# Patient Record
(demographics unavailable — no encounter records)

---

## 2025-05-22 NOTE — PHYSICAL EXAM
[de-identified] : Constitutional: 63 year old female, alert and oriented, cooperative, in no acute distress.  HEENT  NC/AT.  Appearance: symmetric  Neck/Back Straight without deformity or instability.  Good ROM.  Chest/Respiratory  Respiratory effort: no intercostal retractions or use of accessory muscles. Nonlabored Breathing  Mental Status:  Judgment, insight: intact Orientation: oriented to time, place, and person  Neurological: Sensory and Motor are grossly intact throughout  Right Hip Exam: Inspection/Appearance:      Incision well healing. There is a small area of serous drainage at the distal aspect of the incision. No erythema. No purulence.  Neurologic Exam     Motor intact including 5/5 Extensor Hallucis Longus, 5/5 Flexor Hallucis Longus, 5/5 Tibialis Anterior and 5/5 Gastrocnemius     Sensation Intact to Light Touch including Saphenous, Sural, Superficial Peroneal, Deep Peroneal, Tibial nerve distributions

## 2025-05-22 NOTE — DISCUSSION/SUMMARY
[de-identified] : Jody Broussard is a 63-year-old female who presents to the office for follow-up of her right hip DAIR.  Discussed with patient the management of her right hip at this time.  Patient will continue her IV antibiotics.  Discussed follow-up with infectious disease.  Discussed chronic suppressive antibiotics.  Discussed management of patient's incision at this time.  Prevena wound VAC was placed over the distal aspect of the incision.  Discussed that there is no purulence at this time.  Discussed that there is mild serous drainage.  Patient will continue with physical therapy.  Patient will follow-up in 1 week for reevaluation and management.  Patient understanding and in agreement with the plan.  All questions answered.  Plan: - Prevena wound VAC - Follow-up with infectious disease - Likely will need long-term chronic oral suppressive antibiotics - Follow-up in 1 week for reevaluation and management

## 2025-05-22 NOTE — HISTORY OF PRESENT ILLNESS
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  Patient is currently doing well overall.  Her hip pain has significantly improved.  She is currently on IV antibiotics.  Patient does have some mild serous drainage from the distal aspect of her wound.  This is improving.  No fevers or chills.

## 2025-05-22 NOTE — REVIEW OF SYSTEMS
[Joint Pain] : joint pain [Joint Stiffness] : no joint stiffness [Joint Swelling] : no joint swelling [Fever] : no fever [Chills] : no chills

## 2025-05-29 NOTE — DISCUSSION/SUMMARY
[de-identified] : Jody Broussard is a 63-year-old female who presents to the office for follow-up of her right hip DAIR.  Discussed with patient the management of her right hip at this time.  Patient will continue her IV antibiotics.  Discussed follow-up with infectious disease.  Discussed chronic suppressive antibiotics.  Discussed management of patient's incision at this time.  Prevena wound VAC was placed over the distal aspect of the incision.  Discussed that there is no purulence at this time.  Discussed that there is mild serous drainage.  Patient will continue with physical therapy.  Patient will follow-up in 1 week for reevaluation and management.  Patient understanding and in agreement with the plan.  All questions answered.  Plan: - Prevena wound VAC - Follow-up with infectious disease - Likely will need long-term chronic oral suppressive antibiotics - Follow-up in 1 week for reevaluation and management

## 2025-05-29 NOTE — REVIEW OF SYSTEMS
[Joint Pain] : joint pain [Joint Swelling] : no joint swelling [Joint Stiffness] : no joint stiffness [Fever] : no fever [Chills] : no chills

## 2025-05-29 NOTE — PHYSICAL EXAM
[de-identified] : Constitutional: 63 year old female, alert and oriented, cooperative, in no acute distress.  HEENT  NC/AT.  Appearance: symmetric  Neck/Back Straight without deformity or instability.  Good ROM.  Chest/Respiratory  Respiratory effort: no intercostal retractions or use of accessory muscles. Nonlabored Breathing  Mental Status:  Judgment, insight: intact Orientation: oriented to time, place, and person  Neurological: Sensory and Motor are grossly intact throughout  Right Hip Exam: Inspection/Appearance:      Incision well healing. There is a small area of serous drainage at the distal aspect of the incision. No erythema. No purulence. Drainage improving.  Neurologic Exam     Motor intact including 5/5 Extensor Hallucis Longus, 5/5 Flexor Hallucis Longus, 5/5 Tibialis Anterior and 5/5 Gastrocnemius     Sensation Intact to Light Touch including Saphenous, Sural, Superficial Peroneal, Deep Peroneal, Tibial nerve distributions

## 2025-05-29 NOTE — HISTORY OF PRESENT ILLNESS
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  Patient is currently doing well overall.  Her hip pain has significantly improved.  She is currently on IV antibiotics.  Patient does have some mild serous drainage from the distal aspect of her wound.  This is improving with wound vac.  No fevers or chills.  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  Patient is currently doing well overall.  Her hip pain has significantly improved.  She is currently on IV antibiotics.  Patient does have some mild serous drainage from the distal aspect of her wound.  This is improving.  No fevers or chills.

## 2025-06-01 NOTE — HISTORY OF PRESENT ILLNESS
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  Patient is currently doing well overall.  Her hip pain has significantly improved.  She is currently on IV antibiotics.  Patient does have some mild serous drainage from the distal aspect of her wound.  This is improving with wound vac.  No fevers or chills.  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  Patient is currently doing well overall.  Her hip pain has significantly improved.  She is currently on IV antibiotics.  Patient does have some mild serous drainage from the distal aspect of her wound.  This is improving.  No fevers or chills.

## 2025-06-01 NOTE — PHYSICAL EXAM
[de-identified] : Constitutional: 63 year old female, alert and oriented, cooperative, in no acute distress.  HEENT  NC/AT.  Appearance: symmetric  Neck/Back Straight without deformity or instability.  Good ROM.  Chest/Respiratory  Respiratory effort: no intercostal retractions or use of accessory muscles. Nonlabored Breathing  Mental Status:  Judgment, insight: intact Orientation: oriented to time, place, and person  Neurological: Sensory and Motor are grossly intact throughout  Right Hip Exam: Inspection/Appearance:      Incision well healing. There is a small area of serous drainage at the distal aspect of the incision. No erythema. No purulence. Drainage improving.  Neurologic Exam     Motor intact including 5/5 Extensor Hallucis Longus, 5/5 Flexor Hallucis Longus, 5/5 Tibialis Anterior and 5/5 Gastrocnemius     Sensation Intact to Light Touch including Saphenous, Sural, Superficial Peroneal, Deep Peroneal, Tibial nerve distributions

## 2025-06-01 NOTE — DISCUSSION/SUMMARY
[de-identified] : Jody Broussard is a 63-year-old female who presents to the office for follow-up of her right hip DAIR.  Discussed with patient the management of her right hip at this time.  Patient will continue her IV antibiotics.  Discussed follow-up with infectious disease.  Discussed chronic suppressive antibiotics.  Discussed management of patient's incision at this time.  Prevena wound VAC was placed over the distal aspect of the incision.  Discussed that there is no purulence at this time.  Discussed that there is mild serous drainage.  Patient will continue with physical therapy.  Patient will follow-up in 1 week for reevaluation and management.  Patient understanding and in agreement with the plan.  All questions answered.  Plan: - Prevena wound VAC - Follow-up with infectious disease - Likely will need long-term chronic oral suppressive antibiotics - Follow-up in 1 week for reevaluation and management

## 2025-06-03 NOTE — DISCUSSION/SUMMARY
[de-identified] : Jody Broussard is a 63-year-old female who presents to the office for follow-up of her right hip DAIR.  Discussed with patient the management of her right hip at this time.  Patient will continue her IV antibiotics.  Discussed follow-up with infectious disease.  Discussed chronic suppressive antibiotics.  Discussed management of patient's incision at this time.  Prevena wound VAC was placed over the distal aspect of the incision.  Discussed that there is no purulence at this time.  Discussed that there is mild serous drainage.  Patient will continue with physical therapy.  Patient will follow-up in 1 week for reevaluation and management.  Patient understanding and in agreement with the plan.  All questions answered.  Plan: - Prevena wound VAC - Follow-up with infectious disease - Likely will need long-term chronic oral suppressive antibiotics - Follow-up in 1 week for reevaluation and management

## 2025-06-03 NOTE — HISTORY OF PRESENT ILLNESS
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  Patient is currently doing well overall.  Her hip pain has significantly improved.  She is currently on IV antibiotics.  Patient does have some mild serous drainage from the distal aspect of her wound.  This is improving with wound vac.  No fevers or chills.  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  Patient is currently doing well overall.  Her hip pain has significantly improved.  She is currently on IV antibiotics.  Patient does have some mild serous drainage from the distal aspect of her wound.  This is improving.  No fevers or chills.

## 2025-06-03 NOTE — HISTORY OF PRESENT ILLNESS
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  Patient is currently doing well overall.  Her hip pain has significantly improved.  She is currently on IV antibiotics.  Patient does have some mild serous drainage from the distal aspect of her wound.  This is improving with wound vac.  No fevers or chills.  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  Patient is currently doing well overall.  Her hip pain has significantly improved.  She is currently on IV antibiotics.  Patient does have some mild serous drainage from the distal aspect of her wound.  This is improving.  No fevers or chills.

## 2025-06-03 NOTE — PHYSICAL EXAM
[de-identified] : Constitutional: 63 year old female, alert and oriented, cooperative, in no acute distress.  HEENT  NC/AT.  Appearance: symmetric  Neck/Back Straight without deformity or instability.  Good ROM.  Chest/Respiratory  Respiratory effort: no intercostal retractions or use of accessory muscles. Nonlabored Breathing  Mental Status:  Judgment, insight: intact Orientation: oriented to time, place, and person  Neurological: Sensory and Motor are grossly intact throughout  Right Hip Exam: Inspection/Appearance:      Incision well healing. There is a small area of serous drainage at the distal aspect of the incision. No erythema. No purulence. Drainage improving.  Neurologic Exam     Motor intact including 5/5 Extensor Hallucis Longus, 5/5 Flexor Hallucis Longus, 5/5 Tibialis Anterior and 5/5 Gastrocnemius     Sensation Intact to Light Touch including Saphenous, Sural, Superficial Peroneal, Deep Peroneal, Tibial nerve distributions

## 2025-06-03 NOTE — DISCUSSION/SUMMARY
[de-identified] : Jody Broussard is a 63-year-old female who presents to the office for follow-up of her right hip DAIR.  Discussed with patient the management of her right hip at this time.  Patient will continue her IV antibiotics.  Discussed follow-up with infectious disease.  Discussed chronic suppressive antibiotics.  Discussed management of patient's incision at this time.  Prevena wound VAC was placed over the distal aspect of the incision.  Discussed that there is no purulence at this time.  Discussed that there is mild serous drainage.  Patient will continue with physical therapy.  Patient will follow-up in 1 week for reevaluation and management.  Patient understanding and in agreement with the plan.  All questions answered.  Plan: - Prevena wound VAC - Follow-up with infectious disease - Likely will need long-term chronic oral suppressive antibiotics - Follow-up in 1 week for reevaluation and management

## 2025-06-03 NOTE — PHYSICAL EXAM
[de-identified] : Constitutional: 63 year old female, alert and oriented, cooperative, in no acute distress.  HEENT  NC/AT.  Appearance: symmetric  Neck/Back Straight without deformity or instability.  Good ROM.  Chest/Respiratory  Respiratory effort: no intercostal retractions or use of accessory muscles. Nonlabored Breathing  Mental Status:  Judgment, insight: intact Orientation: oriented to time, place, and person  Neurological: Sensory and Motor are grossly intact throughout  Right Hip Exam: Inspection/Appearance:      Incision well healing. There is a small area of serous drainage at the distal aspect of the incision. No erythema. No purulence. Drainage improving.  Neurologic Exam     Motor intact including 5/5 Extensor Hallucis Longus, 5/5 Flexor Hallucis Longus, 5/5 Tibialis Anterior and 5/5 Gastrocnemius     Sensation Intact to Light Touch including Saphenous, Sural, Superficial Peroneal, Deep Peroneal, Tibial nerve distributions

## 2025-06-06 NOTE — ASSESSMENT
[FreeTextEntry1] : 64 y/o F w/ PMHx of new dx DM2 (A1c 13.7),  b/l septic arthritis s/p IV abx, avascular necrosis, b/l RAJ in 2014 admitted for non-traumatic R hip pain, seen by ortho w/ low c/f PJI, however now with fevers and leukocytosis, only mild on presentation. S/p CT A/P w/ c/f cholangitis, MRCP w/ possible debris vs stone now s/p ERCP w/ only mild sludge, no stones.   MRI R Hip with very large joint effusion 16 x 7.8 x 3.5, s/p IR hip arthrocentesis on 4/24, 163k cells, PCR w/ strep pneumonia, consistent PJI. S/p DAIR on 4/27, RTOR on 5/3 for repeat revision. Pt discharged on Ceftriaxone 2 g q24 for 6 weeks after surgery until 6/7/25.   Pt doing well post OP recovering appropriately.   Recommendations:  Ceftriaxone 2 g q24 for 6 weeks after surgery until 6/7/25, can remove PICC after.  Then Cefuroxime 500 mg BID PO until 7/18/25, then suppression with Cefuroxime 250 mg PO  Follow up with me in 1 month

## 2025-06-06 NOTE — HISTORY OF PRESENT ILLNESS
[FreeTextEntry1] : 64 y/o F w/ PMHx of new dx DM2 (A1c 13.7),  b/l septic arthritis s/p IV abx, avascular necrosis, b/l RAJ in 2014 admitted for non-traumatic R hip pain, seen by ortho w/ low c/f PJI, however now with fevers and leukocytosis, only mild on presentation. S/p CT A/P w/ c/f cholangitis, MRCP w/ possible debris vs stone now s/p ERCP w/ only mild sludge, no stones.   MRI R Hip with very large joint effusion 16 x 7.8 x 3.5, s/p IR hip arthrocentesis on 4/24, 163k cells, PCR w/ strep pneumonia, consistent PJI. S/p DAIR on 4/27, RTOR on 5/3 for repeat revision. Pt discharged on Ceftriaxone 2 g q24 for 6 weeks after surgery.  Pt seen in the office, doing well since her surgery, she is currently at [place], receiving Ceftriaxone via PICC. States she is having daily PT, R hip is feeling well, wound VAC removed.

## 2025-06-06 NOTE — HISTORY OF PRESENT ILLNESS
[FreeTextEntry1] : 62 y/o F w/ PMHx of new dx DM2 (A1c 13.7),  b/l septic arthritis s/p IV abx, avascular necrosis, b/l RAJ in 2014 admitted for non-traumatic R hip pain, seen by ortho w/ low c/f PJI, however now with fevers and leukocytosis, only mild on presentation. S/p CT A/P w/ c/f cholangitis, MRCP w/ possible debris vs stone now s/p ERCP w/ only mild sludge, no stones.   MRI R Hip with very large joint effusion 16 x 7.8 x 3.5, s/p IR hip arthrocentesis on 4/24, 163k cells, PCR w/ strep pneumonia, consistent PJI. S/p DAIR on 4/27, RTOR on 5/3 for repeat revision. Pt discharged on Ceftriaxone 2 g q24 for 6 weeks after surgery.  Pt seen in the office, doing well since her surgery, she is currently at [place], receiving Ceftriaxone via PICC. States she is having daily PT, R hip is feeling well, wound VAC removed.

## 2025-06-06 NOTE — PHYSICAL EXAM
[General Appearance - Alert] : alert [General Appearance - In No Acute Distress] : in no acute distress [General Appearance - Well Nourished] : well nourished [Sclera] : the sclera and conjunctiva were normal [Outer Ear] : the ears and nose were normal in appearance [Neck Appearance] : the appearance of the neck was normal [Respiration, Rhythm And Depth] : normal respiratory rhythm and effort [Exaggerated Use Of Accessory Muscles For Inspiration] : no accessory muscle use [Auscultation Breath Sounds / Voice Sounds] : lungs were clear to auscultation bilaterally [Heart Rate And Rhythm] : heart rate was normal and rhythm regular [Heart Sounds] : normal S1 and S2 [Bowel Sounds] : normal bowel sounds [Abdomen Tenderness] : non-tender [] : no rash [FreeTextEntry1] : LUE PICC well appearing  [Oriented To Time, Place, And Person] : oriented to person, place, and time [Affect] : the affect was normal

## 2025-06-06 NOTE — ASSESSMENT
[FreeTextEntry1] : 62 y/o F w/ PMHx of new dx DM2 (A1c 13.7),  b/l septic arthritis s/p IV abx, avascular necrosis, b/l RAJ in 2014 admitted for non-traumatic R hip pain, seen by ortho w/ low c/f PJI, however now with fevers and leukocytosis, only mild on presentation. S/p CT A/P w/ c/f cholangitis, MRCP w/ possible debris vs stone now s/p ERCP w/ only mild sludge, no stones.   MRI R Hip with very large joint effusion 16 x 7.8 x 3.5, s/p IR hip arthrocentesis on 4/24, 163k cells, PCR w/ strep pneumonia, consistent PJI. S/p DAIR on 4/27, RTOR on 5/3 for repeat revision. Pt discharged on Ceftriaxone 2 g q24 for 6 weeks after surgery until 6/7/25.   Pt doing well post OP recovering appropriately.   Recommendations:  Ceftriaxone 2 g q24 for 6 weeks after surgery until 6/7/25, can remove PICC after.  Then Cefuroxime 500 mg BID PO until 7/18/25, then suppression with Cefuroxime 250 mg PO  Follow up with me in 1 month

## 2025-06-12 NOTE — DISCUSSION/SUMMARY
[de-identified] : Jody Broussard is a 63-year-old female who presents to the office for follow-up of her right hip DAIR.  Discussed with patient the management of her right hip at this time.  Patient will continue her IV antibiotics.  Discussed follow-up with infectious disease.  Discussed chronic suppressive antibiotics.  Discussed management of patient's incision at this time.  Telfa dressing was placed. Discussed that there is no purulence at this time. Patient will continue with physical therapy.  Patient will follow-up in 2 weeks for reevaluation and management.  Patient understanding and in agreement with the plan.  All questions answered.  Plan: - Dressing changes - Follow-up with infectious disease - Likely will need long-term chronic oral suppressive antibiotics - Follow-up in 2 week for reevaluation and management

## 2025-06-12 NOTE — PHYSICAL EXAM
[de-identified] : Constitutional: 63 year old female, alert and oriented, cooperative, in no acute distress.  HEENT  NC/AT.  Appearance: symmetric  Neck/Back Straight without deformity or instability.  Good ROM.  Chest/Respiratory  Respiratory effort: no intercostal retractions or use of accessory muscles. Nonlabored Breathing  Mental Status:  Judgment, insight: intact Orientation: oriented to time, place, and person  Neurological: Sensory and Motor are grossly intact throughout  Right Hip Exam: Inspection/Appearance:      Incision well healing. No erythema. No purulence. Drainage improved.  Neurologic Exam     Motor intact including 5/5 Extensor Hallucis Longus, 5/5 Flexor Hallucis Longus, 5/5 Tibialis Anterior and 5/5 Gastrocnemius     Sensation Intact to Light Touch including Saphenous, Sural, Superficial Peroneal, Deep Peroneal, Tibial nerve distributions

## 2025-06-12 NOTE — PHYSICAL EXAM
[de-identified] : Constitutional: 63 year old female, alert and oriented, cooperative, in no acute distress.  HEENT  NC/AT.  Appearance: symmetric  Neck/Back Straight without deformity or instability.  Good ROM.  Chest/Respiratory  Respiratory effort: no intercostal retractions or use of accessory muscles. Nonlabored Breathing  Mental Status:  Judgment, insight: intact Orientation: oriented to time, place, and person  Neurological: Sensory and Motor are grossly intact throughout  Right Hip Exam: Inspection/Appearance:      Incision well healing. No erythema. No purulence. Drainage improved.  Neurologic Exam     Motor intact including 5/5 Extensor Hallucis Longus, 5/5 Flexor Hallucis Longus, 5/5 Tibialis Anterior and 5/5 Gastrocnemius     Sensation Intact to Light Touch including Saphenous, Sural, Superficial Peroneal, Deep Peroneal, Tibial nerve distributions

## 2025-06-12 NOTE — HISTORY OF PRESENT ILLNESS
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 Patient is currently doing well overall. Her hip pain has significantly improved. She is currently on PO antibiotics.   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  Patient is currently doing well overall.  Her hip pain has significantly improved.  She is currently on IV antibiotics.  Patient does have some mild serous drainage from the distal aspect of her wound.  This is improving with wound vac.  No fevers or chills.  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  Patient is currently doing well overall.  Her hip pain has significantly improved.  She is currently on IV antibiotics.  Patient does have some mild serous drainage from the distal aspect of her wound.  This is improving.  No fevers or chills.

## 2025-06-12 NOTE — DISCUSSION/SUMMARY
Recommend OBSERVATION and continued MONITORING for progression. [de-identified] : Jody Broussard is a 63-year-old female who presents to the office for follow-up of her right hip DAIR.  Discussed with patient the management of her right hip at this time.  Patient will continue her IV antibiotics.  Discussed follow-up with infectious disease.  Discussed chronic suppressive antibiotics.  Discussed management of patient's incision at this time.  Telfa dressing was placed. Discussed that there is no purulence at this time. Patient will continue with physical therapy.  Patient will follow-up in 2 weeks for reevaluation and management.  Patient understanding and in agreement with the plan.  All questions answered.  Plan: - Dressing changes - Follow-up with infectious disease - Likely will need long-term chronic oral suppressive antibiotics - Follow-up in 2 week for reevaluation and management

## 2025-06-12 NOTE — HISTORY OF PRESENT ILLNESS
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 Patient is currently doing well overall. Her hip pain has significantly improved. She is currently on PO antibiotics.   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  Patient is currently doing well overall.  Her hip pain has significantly improved.  She is currently on IV antibiotics.  Patient does have some mild serous drainage from the distal aspect of her wound.  This is improving with wound vac.  No fevers or chills.  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  Patient is currently doing well overall.  Her hip pain has significantly improved.  She is currently on IV antibiotics.  Patient does have some mild serous drainage from the distal aspect of her wound.  This is improving.  No fevers or chills. no

## 2025-06-26 NOTE — DISCUSSION/SUMMARY
[de-identified] : Jody Broussard is a 63-year-old female who presents to the office for follow-up of her right hip DAIR.  Discussed with patient the management of her right hip at this time.  Patient will continue her IV antibiotics.  Discussed follow-up with infectious disease.  Discussed chronic suppressive antibiotics.   Patient will continue with physical therapy.  Patient will follow-up in 2 months for reevaluation and management.  Patient understanding and in agreement with the plan.  All questions answered.  Plan: - Follow-up with infectious disease - Likely will need long-term chronic oral suppressive antibiotics - Follow-up in 2 months for reevaluation and management

## 2025-06-26 NOTE — DISCUSSION/SUMMARY
[de-identified] : Jody Broussard is a 63-year-old female who presents to the office for follow-up of her right hip DAIR.  Discussed with patient the management of her right hip at this time.  Patient will continue her IV antibiotics.  Discussed follow-up with infectious disease.  Discussed chronic suppressive antibiotics.   Patient will continue with physical therapy.  Patient will follow-up in 2 months for reevaluation and management.  Patient understanding and in agreement with the plan.  All questions answered.  Plan: - Follow-up with infectious disease - Likely will need long-term chronic oral suppressive antibiotics - Follow-up in 2 months for reevaluation and management

## 2025-06-26 NOTE — PHYSICAL EXAM
[de-identified] : Constitutional: 63 year old female, alert and oriented, cooperative, in no acute distress.  HEENT  NC/AT.  Appearance: symmetric  Neck/Back Straight without deformity or instability.  Good ROM.  Chest/Respiratory  Respiratory effort: no intercostal retractions or use of accessory muscles. Nonlabored Breathing  Mental Status:  Judgment, insight: intact Orientation: oriented to time, place, and person  Neurological: Sensory and Motor are grossly intact throughout  Right Hip Exam: Inspection/Appearance:      Incision well healed. No erythema. No drainage  Neurologic Exam     Motor intact including 5/5 Extensor Hallucis Longus, 5/5 Flexor Hallucis Longus, 5/5 Tibialis Anterior and 5/5 Gastrocnemius     Sensation Intact to Light Touch including Saphenous, Sural, Superficial Peroneal, Deep Peroneal, Tibial nerve distributions

## 2025-06-26 NOTE — HISTORY OF PRESENT ILLNESS
[de-identified] : 6/26/2025  Jody Broussard presents to the office for follow-up of her right hip status post two-stage DAIR.  Patient is currently doing well overall.  She does not have hip pain.  Her incision is healed.  She is currently on PO antibiotics.  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 Patient is currently doing well overall. Her hip pain has significantly improved. She is currently on PO antibiotics.   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  Patient is currently doing well overall.  Her hip pain has significantly improved.  She is currently on IV antibiotics.  Patient does have some mild serous drainage from the distal aspect of her wound.  This is improving with wound vac.  No fevers or chills.  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  Patient is currently doing well overall.  Her hip pain has significantly improved.  She is currently on IV antibiotics.  Patient does have some mild serous drainage from the distal aspect of her wound.  This is improving.  No fevers or chills.

## 2025-06-26 NOTE — PHYSICAL EXAM
[de-identified] : Constitutional: 63 year old female, alert and oriented, cooperative, in no acute distress.  HEENT  NC/AT.  Appearance: symmetric  Neck/Back Straight without deformity or instability.  Good ROM.  Chest/Respiratory  Respiratory effort: no intercostal retractions or use of accessory muscles. Nonlabored Breathing  Mental Status:  Judgment, insight: intact Orientation: oriented to time, place, and person  Neurological: Sensory and Motor are grossly intact throughout  Right Hip Exam: Inspection/Appearance:      Incision well healed. No erythema. No drainage  Neurologic Exam     Motor intact including 5/5 Extensor Hallucis Longus, 5/5 Flexor Hallucis Longus, 5/5 Tibialis Anterior and 5/5 Gastrocnemius     Sensation Intact to Light Touch including Saphenous, Sural, Superficial Peroneal, Deep Peroneal, Tibial nerve distributions

## 2025-06-26 NOTE — HISTORY OF PRESENT ILLNESS
[de-identified] : 6/26/2025  Jody Broussard presents to the office for follow-up of her right hip status post two-stage DAIR.  Patient is currently doing well overall.  She does not have hip pain.  Her incision is healed.  She is currently on PO antibiotics.  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 Patient is currently doing well overall. Her hip pain has significantly improved. She is currently on PO antibiotics.   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  Patient is currently doing well overall.  Her hip pain has significantly improved.  She is currently on IV antibiotics.  Patient does have some mild serous drainage from the distal aspect of her wound.  This is improving with wound vac.  No fevers or chills.  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  Patient is currently doing well overall.  Her hip pain has significantly improved.  She is currently on IV antibiotics.  Patient does have some mild serous drainage from the distal aspect of her wound.  This is improving.  No fevers or chills.

## 2025-07-11 NOTE — ASSESSMENT
[FreeTextEntry1] : 64 y/o F w/ PMHx of new dx DM2 (A1c 13.7), b/l septic arthritis s/p IV abx, avascular necrosis, b/l RAJ in 2014 admitted for non-traumatic R hip pain, MRI R Hip with very large joint effusion 16 x 7.8 x 3.5, s/p IR hip arthrocentesis on 4/24, 163k cells, PCR w/ strep pneumonia, consistent PJI. S/p DAIR on 4/27, RTOR on 5/3 for repeat revision. Pt discharged on Ceftriaxone 2 g q24 for 6 weeks after surgery until 6/7/25.  #R Hip PJI s/p DAIR on 4/27, 5/3, PCR w/ Strep pneumo, s/p 6 weeks of Ceftriaxone, now on PO cefuroxime suppression  Recommendations: Cefuroxime 500 mg BID PO until 7/18/25, then suppression with Cefuroxime 250 mg PO BID indefinitely. Would aim for at least 1 year and likely long if Ms. Broussard can tolerate.   Follow up with me in 3 months

## 2025-07-11 NOTE — PHYSICAL EXAM
[General Appearance - Alert] : alert [General Appearance - In No Acute Distress] : in no acute distress [Sclera] : the sclera and conjunctiva were normal [Outer Ear] : the ears and nose were normal in appearance [Neck Appearance] : the appearance of the neck was normal [Exaggerated Use Of Accessory Muscles For Inspiration] : no accessory muscle use [Heart Rate And Rhythm] : heart rate was normal and rhythm regular [Heart Sounds] : normal S1 and S2 [FreeTextEntry1] : R hip surgical site well appearing  [] : no rash [Oriented To Time, Place, And Person] : oriented to person, place, and time

## 2025-07-11 NOTE — HISTORY OF PRESENT ILLNESS
[FreeTextEntry1] : 64 y/o F w/ PMHx of new dx DM2 (A1c 13.7), b/l septic arthritis s/p IV abx, avascular necrosis, b/l RAJ in 2014 admitted for non-traumatic R hip pain, MRI R Hip with very large joint effusion 16 x 7.8 x 3.5, s/p IR hip arthrocentesis on 4/24, 163k cells, PCR w/ strep pneumonia, consistent PJI. S/p DAIR on 4/27, RTOR on 5/3 for repeat revision, discharged on Ceftriaxone 2 g q24 for 6 weeks after surgery.  Pt seen in office today, now only oral abx, is reporting doing well. Following closely with orthopedics, R hip is healing well.  Pt tolerating abx well, no acute complaints. Walking without assistance, back at work.